# Patient Record
Sex: FEMALE | Race: WHITE | Employment: FULL TIME | URBAN - METROPOLITAN AREA
[De-identification: names, ages, dates, MRNs, and addresses within clinical notes are randomized per-mention and may not be internally consistent; named-entity substitution may affect disease eponyms.]

---

## 2022-12-26 ENCOUNTER — HOSPITAL ENCOUNTER (EMERGENCY)
Age: 32
Discharge: HOME | End: 2022-12-26
Attending: EMERGENCY MEDICINE
Payer: COMMERCIAL

## 2022-12-26 VITALS
TEMPERATURE: 97.5 F | RESPIRATION RATE: 17 BRPM | OXYGEN SATURATION: 100 % | DIASTOLIC BLOOD PRESSURE: 75 MMHG | BODY MASS INDEX: 25.11 KG/M2 | SYSTOLIC BLOOD PRESSURE: 109 MMHG | HEART RATE: 98 BPM | WEIGHT: 160 LBS | HEIGHT: 67 IN

## 2022-12-26 DIAGNOSIS — R21 RASH AND OTHER NONSPECIFIC SKIN ERUPTION: Primary | ICD-10-CM

## 2022-12-26 LAB
ALBUMIN SERPL-MCNC: 4.1 G/DL (ref 3.4–5)
ALBUMIN/GLOB SERPL: 1.2 {RATIO} (ref 0.8–1.7)
ALP SERPL-CCNC: 74 U/L (ref 45–117)
ALT SERPL-CCNC: 24 U/L (ref 13–56)
ANION GAP SERPL CALC-SCNC: 6 MMOL/L (ref 3–18)
AST SERPL-CCNC: 22 U/L (ref 10–38)
BASOPHILS # BLD: 0 K/UL (ref 0–0.1)
BASOPHILS NFR BLD: 0 % (ref 0–2)
BILIRUB SERPL-MCNC: 0.4 MG/DL (ref 0.2–1)
BUN SERPL-MCNC: 15 MG/DL (ref 7–18)
BUN/CREAT SERPL: 19 (ref 12–20)
CALCIUM SERPL-MCNC: 8.9 MG/DL (ref 8.5–10.1)
CHLORIDE SERPL-SCNC: 107 MMOL/L (ref 100–111)
CO2 SERPL-SCNC: 24 MMOL/L (ref 21–32)
CREAT SERPL-MCNC: 0.78 MG/DL (ref 0.6–1.3)
DIFFERENTIAL METHOD BLD: ABNORMAL
EOSINOPHIL # BLD: 0.2 K/UL (ref 0–0.4)
EOSINOPHIL NFR BLD: 7 % (ref 0–5)
ERYTHROCYTE [DISTWIDTH] IN BLOOD BY AUTOMATED COUNT: 12.7 % (ref 11.6–14.5)
GLOBULIN SER CALC-MCNC: 3.5 G/DL (ref 2–4)
GLUCOSE SERPL-MCNC: 92 MG/DL (ref 74–99)
HCT VFR BLD AUTO: 35.8 % (ref 35–45)
HETEROPH AB SER QL: NEGATIVE
HGB BLD-MCNC: 12.8 G/DL (ref 12–16)
IMM GRANULOCYTES # BLD AUTO: 0 K/UL (ref 0–0.04)
IMM GRANULOCYTES NFR BLD AUTO: 0 % (ref 0–0.5)
INR PPP: 1 (ref 0.8–1.2)
LYMPHOCYTES # BLD: 0.6 K/UL (ref 0.9–3.6)
LYMPHOCYTES NFR BLD: 18 % (ref 21–52)
MCH RBC QN AUTO: 30.4 PG (ref 24–34)
MCHC RBC AUTO-ENTMCNC: 35.8 G/DL (ref 31–37)
MCV RBC AUTO: 85 FL (ref 78–100)
MONOCYTES # BLD: 0.5 K/UL (ref 0.05–1.2)
MONOCYTES NFR BLD: 14 % (ref 3–10)
NEUTS SEG # BLD: 2 K/UL (ref 1.8–8)
NEUTS SEG NFR BLD: 61 % (ref 40–73)
NRBC # BLD: 0 K/UL (ref 0–0.01)
NRBC BLD-RTO: 0 PER 100 WBC
PLATELET # BLD AUTO: 217 K/UL (ref 135–420)
PMV BLD AUTO: 9.7 FL (ref 9.2–11.8)
POTASSIUM SERPL-SCNC: 3.8 MMOL/L (ref 3.5–5.5)
PROT SERPL-MCNC: 7.6 G/DL (ref 6.4–8.2)
PROTHROMBIN TIME: 13.3 SEC (ref 11.5–15.2)
RBC # BLD AUTO: 4.21 M/UL (ref 4.2–5.3)
SODIUM SERPL-SCNC: 137 MMOL/L (ref 136–145)
WBC # BLD AUTO: 3.3 K/UL (ref 4.6–13.2)

## 2022-12-26 PROCEDURE — 99284 EMERGENCY DEPT VISIT MOD MDM: CPT

## 2022-12-26 PROCEDURE — 96375 TX/PRO/DX INJ NEW DRUG ADDON: CPT

## 2022-12-26 PROCEDURE — 86308 HETEROPHILE ANTIBODY SCREEN: CPT

## 2022-12-26 PROCEDURE — 80053 COMPREHEN METABOLIC PANEL: CPT

## 2022-12-26 PROCEDURE — 85610 PROTHROMBIN TIME: CPT

## 2022-12-26 PROCEDURE — 96374 THER/PROPH/DIAG INJ IV PUSH: CPT

## 2022-12-26 PROCEDURE — 85025 COMPLETE CBC W/AUTO DIFF WBC: CPT

## 2022-12-26 PROCEDURE — 74011250636 HC RX REV CODE- 250/636: Performed by: PHYSICIAN ASSISTANT

## 2022-12-26 RX ORDER — FAMOTIDINE 10 MG/ML
20 INJECTION INTRAVENOUS
Status: COMPLETED | OUTPATIENT
Start: 2022-12-26 | End: 2022-12-26

## 2022-12-26 RX ADMIN — METHYLPREDNISOLONE SODIUM SUCCINATE 125 MG: 125 INJECTION, POWDER, FOR SOLUTION INTRAMUSCULAR; INTRAVENOUS at 10:23

## 2022-12-26 RX ADMIN — FAMOTIDINE 20 MG: 10 INJECTION, SOLUTION INTRAVENOUS at 10:23

## 2022-12-26 NOTE — ED PROVIDER NOTES
EMERGENCY DEPARTMENT HISTORY AND PHYSICAL EXAM      Date: 12/26/2022  Patient Name: Linsey Fuller    History of Presenting Illness     Chief Complaint   Patient presents with    Allergic Reaction       History Provided By: Patient    HPI: Linsey Fuller, 33 y.o. female with history of psoriasis, currently being treated with topical emollients is presenting with a diffuse bodily rash that started 6 days ago which was also the same day that she finished taking amoxicillin for a root canal.  The rash has only progressed and is described as red and mildly itchy. She has taken Benadryl without any relief of the symptoms. She denies any peeling of her skin or mucosal involvement. She denies sore throat, shortness of breath, wheezing, fever, chills, vomiting, diarrhea. She is not on any immunomodulating agents. And the amoxicillin is the only new medication or changes in the last couple of weeks. There are no other complaints, changes, or physical findings at this time. Past History     Past Medical History:  No past medical history on file. Past Surgical History:  No past surgical history on file. Family History:  No family history on file. Social History: Allergies: Allergies   Allergen Reactions    Amoxicillin Hives       PCP: Tish Hassan MD    No current facility-administered medications on file prior to encounter. No current outpatient medications on file prior to encounter. Review of Systems   Review of Systems   Constitutional:  Negative for activity change, chills and fever. HENT:  Negative for congestion, ear pain, rhinorrhea, sneezing and sore throat. Eyes:  Negative for pain and visual disturbance. Respiratory:  Negative for cough and shortness of breath. Cardiovascular:  Negative for chest pain. Gastrointestinal:  Negative for abdominal pain, diarrhea, nausea and vomiting. Genitourinary:  Negative for dysuria and hematuria.    Musculoskeletal: Negative for gait problem. Skin:  Positive for color change and rash. Neurological:  Negative for speech difficulty, weakness and headaches. Psychiatric/Behavioral:  The patient is not nervous/anxious. All other systems reviewed and are negative. Physical Exam   Physical Exam  Vitals and nursing note reviewed. Constitutional:       General: She is not in acute distress. Appearance: Normal appearance. She is not ill-appearing, toxic-appearing or diaphoretic. HENT:      Head: Normocephalic and atraumatic. Nose: Nose normal.      Mouth/Throat:      Mouth: Mucous membranes are moist.   Eyes:      Extraocular Movements: Extraocular movements intact. Conjunctiva/sclera: Conjunctivae normal.      Pupils: Pupils are equal, round, and reactive to light. Cardiovascular:      Rate and Rhythm: Normal rate. Pulses: Normal pulses. Heart sounds: Normal heart sounds. Pulmonary:      Effort: Pulmonary effort is normal. No respiratory distress. Breath sounds: Normal breath sounds. Abdominal:      General: Bowel sounds are normal.      Palpations: Abdomen is soft. Tenderness: There is no abdominal tenderness. Musculoskeletal:         General: No deformity or signs of injury. Normal range of motion. Cervical back: Normal range of motion. Skin:     General: Skin is warm and dry. Capillary Refill: Capillary refill takes less than 2 seconds. Coloration: Skin is not cyanotic or mottled. Findings: Erythema and rash present. No bruising, signs of injury or petechiae. Rash is scaling. Comments: Diffuse bodily rash, blanching, warm to touch, circular in appearance. Negative Nikolsky sign. No ulcerations. Located to chest, back, abdomen, arms, legs. Does not involve palmar surface. Scaling rash noted to bilateral forearms and ear lobes c/w psoriasis   Neurological:      General: No focal deficit present.       Mental Status: She is alert and oriented to person, place, and time. Cranial Nerves: No cranial nerve deficit. Psychiatric:         Mood and Affect: Mood normal.       Lab and Diagnostic Study Results   Labs -     Recent Results (from the past 12 hour(s))   MONONUCLEOSIS SCREEN    Collection Time: 12/26/22  9:40 AM   Result Value Ref Range    Mononucleosis screen Negative NEG     PROTHROMBIN TIME + INR    Collection Time: 12/26/22  9:40 AM   Result Value Ref Range    Prothrombin time 13.3 11.5 - 15.2 sec    INR 1.0 0.8 - 1.2     CBC WITH AUTOMATED DIFF    Collection Time: 12/26/22  9:40 AM   Result Value Ref Range    WBC 3.3 (L) 4.6 - 13.2 K/uL    RBC 4.21 4.20 - 5.30 M/uL    HGB 12.8 12.0 - 16.0 g/dL    HCT 35.8 35.0 - 45.0 %    MCV 85.0 78.0 - 100.0 FL    MCH 30.4 24.0 - 34.0 PG    MCHC 35.8 31.0 - 37.0 g/dL    RDW 12.7 11.6 - 14.5 %    PLATELET 336 898 - 721 K/uL    MPV 9.7 9.2 - 11.8 FL    NRBC 0.0 0  WBC    ABSOLUTE NRBC 0.00 0.00 - 0.01 K/uL    NEUTROPHILS 61 40 - 73 %    LYMPHOCYTES 18 (L) 21 - 52 %    MONOCYTES 14 (H) 3 - 10 %    EOSINOPHILS 7 (H) 0 - 5 %    BASOPHILS 0 0 - 2 %    IMMATURE GRANULOCYTES 0 0.0 - 0.5 %    ABS. NEUTROPHILS 2.0 1.8 - 8.0 K/UL    ABS. LYMPHOCYTES 0.6 (L) 0.9 - 3.6 K/UL    ABS. MONOCYTES 0.5 0.05 - 1.2 K/UL    ABS. EOSINOPHILS 0.2 0.0 - 0.4 K/UL    ABS. BASOPHILS 0.0 0.0 - 0.1 K/UL    ABS. IMM. GRANS. 0.0 0.00 - 0.04 K/UL    DF AUTOMATED     METABOLIC PANEL, COMPREHENSIVE    Collection Time: 12/26/22  9:40 AM   Result Value Ref Range    Sodium 137 136 - 145 mmol/L    Potassium 3.8 3.5 - 5.5 mmol/L    Chloride 107 100 - 111 mmol/L    CO2 24 21 - 32 mmol/L    Anion gap 6 3.0 - 18 mmol/L    Glucose 92 74 - 99 mg/dL    BUN 15 7.0 - 18 MG/DL    Creatinine 0.78 0.6 - 1.3 MG/DL    BUN/Creatinine ratio 19 12 - 20      eGFR >60 >60 ml/min/1.73m2    Calcium 8.9 8.5 - 10.1 MG/DL    Bilirubin, total 0.4 0.2 - 1.0 MG/DL    ALT (SGPT) 24 13 - 56 U/L    AST (SGOT) 22 10 - 38 U/L    Alk.  phosphatase 74 45 - 117 U/L    Protein, total 7.6 6.4 - 8.2 g/dL    Albumin 4.1 3.4 - 5.0 g/dL    Globulin 3.5 2.0 - 4.0 g/dL    A-G Ratio 1.2 0.8 - 1.7         Radiologic Studies -   @lastxrresult@  CT Results  (Last 48 hours)      None          CXR Results  (Last 48 hours)      None            Medical Decision Making and ED Course   Differential Diagnosis & Medical Decision Making Provider Note:   81-year-old female with history of psoriasis is presenting with generalized bodily rash, diffuse, blanching, worsening for the last 6 days. Differentials include allergic reaction, erythema multiforme, SJS, vasculitis, mononucleosis. Case discussed with ED attending. Labs currently pending. IV Solu-Medrol and Pepcid did not seem to change the patient's symptoms at all. Mono screening negative, labs all within normal limits however CBC is indicative of potential viral illness. Suspect viral exanthem versus erythema multiforme minor. Discussed at length close return precautions with patient to include any mucous membrane involvement, pain, fever, sloughing of skin. She voices understanding.    - I am the first provider for this patient. I reviewed the vital signs, available nursing notes, past medical history, past surgical history, family history and social history. The patients presenting problems have been discussed, and they are in agreement with the care plan formulated and outlined with them. I have encouraged them to ask questions as they arise throughout their visit. Vital Signs-Reviewed the patient's vital signs.   Patient Vitals for the past 12 hrs:   Temp Pulse Resp BP SpO2   12/26/22 1240 -- -- -- 109/75 100 %   12/26/22 1210 -- -- -- 102/60 97 %   12/26/22 1131 -- -- -- 98/66 100 %   12/26/22 1101 -- -- -- 102/69 100 %   12/26/22 1031 -- -- -- 99/63 100 %   12/26/22 1001 -- -- -- -- 100 %   12/26/22 1000 -- -- -- 100/77 100 %   12/26/22 0947 -- -- -- 102/77 100 %   12/26/22 0911 97.5 °F (36.4 °C) 98 17 117/80 100 %       ED Course: Consult Note:  10:13 AM  Rebeca Martínez PA-C spoke with Dr. Natasha Rolle,  Specialty: ED Attending  Discussed pt's hx, disposition, and available diagnostic and imaging results. Reviewed care plans. Patient was seen and evaluated by ED attending who advises basic labs, coags, mono screening test, IV Pepcid and steroids and reevaluate. Suspicious for erythema multiform versus viral exanthem. Procedures   Performed by: Rebeca Martínez PA-C  Procedures      Disposition   Disposition: DC- Adult Discharges: All of the diagnostic tests were reviewed and questions answered. Diagnosis, care plan and treatment options were discussed. The patient understands the instructions and will follow up as directed. The patients results have been reviewed with them. They have been counseled regarding their diagnosis. The patient and parent verbally convey understanding and agreement of the signs, symptoms, diagnosis, treatment and prognosis and additionally agrees to follow up as recommended with their PCP in 24 - 48 hours. They also agree with the care-plan and convey that all of their questions have been answered. I have also put together some discharge instructions for them that include: 1) educational information regarding their diagnosis, 2) how to care for their diagnosis at home, as well a 3) list of reasons why they would want to return to the ED prior to their follow-up appointment, should their condition change. Discharged     DISCHARGE PLAN:  1. There are no discharge medications for this patient.     2.   Follow-up Information       Follow up With Specialties Details Why Contact Info    Yolis Bond MD Internal Medicine Physician Schedule an appointment as soon as possible for a visit  for follow up from ER visit [address] [place] [address] [place] [id number] [phone]      [facility] EMERGENCY DEPT Emergency Medicine  As needed [address] [address]  [phone]    [facility]   for follow up from ER visit [address]  [phone]          3. Return to ED if worse   4. There are no discharge medications for this patient. Diagnosis/Clinical Impression     Clinical Impression:   1. Rash and other nonspecific skin eruption        Attestations: Sravanthi TAYLOR PA-C, am the primary clinician of record. Please note that this dictation was completed with [employer], the computer voice recognition software. Quite often unanticipated grammatical, syntax, homophones, and other interpretive errors are inadvertently transcribed by the computer software. Please disregard these errors. Please excuse any errors that have escaped final proofreading. Thank you.

## 2022-12-26 NOTE — ED NOTES
Report given to [facility] AND [facility]

## 2022-12-26 NOTE — DISCHARGE INSTRUCTIONS
We suspect your rash is caused by a viral illness of some kind, difficult to say exactly what viral illness. This should resolve on its own but may take a couple of weeks to fully go away. Please come back to the emergency department if you develop any ulcers in your mouth, pain in your mouth, sloughing of your skin, fever, pain, or any of your symptoms get worse. Please follow-up with a dermatologist outpatient. Also avoid amoxicillin until you can get allergy tested.

## 2022-12-26 NOTE — ED TRIAGE NOTES
Pt report that she was prescribed Amoxicillin by denist for infected root canal. Pt noted hives on trunk and arms on 12/21/2022. Pt report taking benadryl 25 mg with no relieve, last benadryl  adminstration was this AM  at 0130. Pt denies SOB and difficulties swallowing. Pt speaks in complete sentences. Noticeable hives noted on chest and arms.